# Patient Record
Sex: MALE | Race: WHITE | NOT HISPANIC OR LATINO | URBAN - METROPOLITAN AREA
[De-identification: names, ages, dates, MRNs, and addresses within clinical notes are randomized per-mention and may not be internally consistent; named-entity substitution may affect disease eponyms.]

---

## 2021-01-28 ENCOUNTER — IMMUNIZATION (OUTPATIENT)
Dept: PEDIATRICS | Facility: CLINIC | Age: 46
End: 2021-01-28
Payer: COMMERCIAL

## 2021-01-28 PROCEDURE — 91300 PR COVID VAC PFIZER DIL RECON 30 MCG/0.3 ML IM: CPT

## 2021-01-28 PROCEDURE — 0001A PR COVID VAC PFIZER DIL RECON 30 MCG/0.3 ML IM: CPT

## 2021-02-18 ENCOUNTER — IMMUNIZATION (OUTPATIENT)
Dept: PEDIATRICS | Facility: CLINIC | Age: 46
End: 2021-02-18
Attending: INTERNAL MEDICINE
Payer: COMMERCIAL

## 2021-02-18 PROCEDURE — 91300 PR COVID VAC PFIZER DIL RECON 30 MCG/0.3 ML IM: CPT

## 2021-02-18 PROCEDURE — 0002A PR COVID VAC PFIZER DIL RECON 30 MCG/0.3 ML IM: CPT

## 2021-03-20 ENCOUNTER — HEALTH MAINTENANCE LETTER (OUTPATIENT)
Age: 46
End: 2021-03-20

## 2021-10-24 ENCOUNTER — HEALTH MAINTENANCE LETTER (OUTPATIENT)
Age: 46
End: 2021-10-24

## 2022-04-10 ENCOUNTER — HEALTH MAINTENANCE LETTER (OUTPATIENT)
Age: 47
End: 2022-04-10

## 2022-10-16 ENCOUNTER — HEALTH MAINTENANCE LETTER (OUTPATIENT)
Age: 47
End: 2022-10-16

## 2023-06-01 ENCOUNTER — HEALTH MAINTENANCE LETTER (OUTPATIENT)
Age: 48
End: 2023-06-01

## 2023-06-05 ENCOUNTER — ANCILLARY PROCEDURE (OUTPATIENT)
Dept: GENERAL RADIOLOGY | Facility: CLINIC | Age: 48
End: 2023-06-05
Attending: FAMILY MEDICINE
Payer: COMMERCIAL

## 2023-06-05 ENCOUNTER — PRE VISIT (OUTPATIENT)
Dept: ORTHOPEDICS | Facility: CLINIC | Age: 48
End: 2023-06-05

## 2023-06-05 ENCOUNTER — OFFICE VISIT (OUTPATIENT)
Dept: ORTHOPEDICS | Facility: CLINIC | Age: 48
End: 2023-06-05
Payer: COMMERCIAL

## 2023-06-05 DIAGNOSIS — M79.672 LEFT FOOT PAIN: Primary | ICD-10-CM

## 2023-06-05 PROCEDURE — 73630 X-RAY EXAM OF FOOT: CPT | Mod: LT | Performed by: RADIOLOGY

## 2023-06-05 PROCEDURE — 73610 X-RAY EXAM OF ANKLE: CPT | Mod: LT | Performed by: RADIOLOGY

## 2023-06-05 PROCEDURE — 99203 OFFICE O/P NEW LOW 30 MIN: CPT | Performed by: FAMILY MEDICINE

## 2023-06-05 NOTE — LETTER
6/5/2023      RE: Jc Monreal  [address]  [facility] [address]     Dear Colleague,    Thank you for referring your patient, Jc Monreal, to the [facility]. Please see a copy of my visit note below.      [facility]  SPORTS & ORTHOPEDIC CLINIC VISIT     Jun 5, 2023        ASSESSMENT & PLAN    47-year-old with acute injury to the left foot, most tender at the calcaneal tuberosity.  No obvious structural abnormality based on exam and radiographs today, although degree of pain and ecchymosis is concerning for occult injury.    Reviewed imaging and assessment with patient in detail  We will try a period of weightbearing as tolerated with crutches, anti-inflammatory as needed, elevation and ice.  If he is still having considerable pain particularly if he is unable to weight-bear after 1 week would have low threshold for pursuit of MRI for further evaluation for occult injury.      Keenan Nava MD  [facility]    -----  Chief Complaint   Patient presents with     Left Foot - Pain       SUBJECTIVE  Jc Monreal is a/an 47 year old male who is seen as a self referral for evaluation of  Left foot pain.     The patient is seen by themselves.  The patient is Right handed    Onset: 1 day(s) ago. Patient describes injury as playing baseball and was running to first base and felt a pain.  Location of Pain: left plantar side and distal achilles   Worsened by: Walking   Better with: NA   Treatments tried: rest/activity avoidance  Associated symptoms: swelling and bruising     Orthopedic/Surgical history: NO  Social History/Occupation: Eye Doctor       REVIEW OF SYSTEMS:    Do you have fever, chills, weight loss? No    Do you have any vision problems? No    Do you have any chest pain or edema? No    Do you have any shortness of breath or wheezing?  No    Do you have stomach problems? No    Do you have  any numbness or focal weakness? No    Do you have diabetes? No    Do you have problems with bleeding or clotting? No    Do you have an rashes or other skin lesions? No    OBJECTIVE:  There were no vitals taken for this visit.     Left foot: No swelling.  Ecchymosis is present over the plantar surface of the midfoot.  There is tenderness to palpation over the lateral portion of the calcaneal tuberosity.  No tenderness over the Achilles tendon.  Mild tenderness over the Achilles insertion but no defect.  No tenderness over the medial or lateral malleoli, tibiotalar joint, midfoot.  Sensation intact.  Cap refill brisk.    RADIOLOGY:    3 view x-rays of the left foot and ankle are performed and reviewed independently demonstrating stable appearance of screw and fifth metatarsal from previous Dewitt fracture.  No acute findings.  See EMR for formal radiology report.          Again, thank you for allowing me to participate in the care of your patient.      Sincerely,    Keenan Nava MD

## 2023-06-05 NOTE — TELEPHONE ENCOUNTER
DIAGNOSIS: L foot/ankle pain and brusing on bottom of foot   APPOINTMENT DATE: 6/5/23   NOTES STATUS DETAILS   MEDICATION LIST Care Everywhere

## 2023-06-05 NOTE — PROGRESS NOTES
[facility]  SPORTS & ORTHOPEDIC CLINIC VISIT     Jun 5, 2023        ASSESSMENT & PLAN    47-year-old with acute injury to the left foot, most tender at the calcaneal tuberosity.  No obvious structural abnormality based on exam and radiographs today, although degree of pain and ecchymosis is concerning for occult injury.    Reviewed imaging and assessment with patient in detail  We will try a period of weightbearing as tolerated with crutches, anti-inflammatory as needed, elevation and ice.  If he is still having considerable pain particularly if he is unable to weight-bear after 1 week would have low threshold for pursuit of MRI for further evaluation for occult injury.      Keenan Nava MD  [facility]    -----  Chief Complaint   Patient presents with     Left Foot - Pain       SUBJECTIVE  Jc Monreal is a/an 47 year old male who is seen as a self referral for evaluation of  Left foot pain.     The patient is seen by themselves.  The patient is Right handed    Onset: 1 day(s) ago. Patient describes injury as playing baseball and was running to first base and felt a pain.  Location of Pain: left plantar side and distal achilles   Worsened by: Walking   Better with: NA   Treatments tried: rest/activity avoidance  Associated symptoms: swelling and bruising     Orthopedic/Surgical history: NO  Social History/Occupation: Eye Doctor       REVIEW OF SYSTEMS:    Do you have fever, chills, weight loss? No    Do you have any vision problems? No    Do you have any chest pain or edema? No    Do you have any shortness of breath or wheezing?  No    Do you have stomach problems? No    Do you have any numbness or focal weakness? No    Do you have diabetes? No    Do you have problems with bleeding or clotting? No    Do you have an rashes or other skin lesions? No    OBJECTIVE:  There were no vitals taken for this visit.     Left foot: No swelling.  Ecchymosis  is present over the plantar surface of the midfoot.  There is tenderness to palpation over the lateral portion of the calcaneal tuberosity.  No tenderness over the Achilles tendon.  Mild tenderness over the Achilles insertion but no defect.  No tenderness over the medial or lateral malleoli, tibiotalar joint, midfoot.  Sensation intact.  Cap refill brisk.    RADIOLOGY:    3 view x-rays of the left foot and ankle are performed and reviewed independently demonstrating stable appearance of screw and fifth metatarsal from previous Dewitt fracture.  No acute findings.  See EMR for formal radiology report.

## 2023-06-17 ENCOUNTER — MYC MEDICAL ADVICE (OUTPATIENT)
Dept: ORTHOPEDICS | Facility: CLINIC | Age: 48
End: 2023-06-17
Payer: COMMERCIAL

## 2023-06-17 DIAGNOSIS — M79.672 LEFT FOOT PAIN: Primary | ICD-10-CM

## 2024-08-10 ENCOUNTER — HEALTH MAINTENANCE LETTER (OUTPATIENT)
Age: 49
End: 2024-08-10

## 2025-08-16 ENCOUNTER — HEALTH MAINTENANCE LETTER (OUTPATIENT)
Age: 50
End: 2025-08-16